# Patient Record
Sex: FEMALE | Race: WHITE | NOT HISPANIC OR LATINO | Employment: UNEMPLOYED | URBAN - METROPOLITAN AREA
[De-identification: names, ages, dates, MRNs, and addresses within clinical notes are randomized per-mention and may not be internally consistent; named-entity substitution may affect disease eponyms.]

---

## 2021-04-08 DIAGNOSIS — Z23 ENCOUNTER FOR IMMUNIZATION: ICD-10-CM

## 2022-10-24 NOTE — PROGRESS NOTES
PT Evaluation     Today's date: 10/25/2022  Patient name: Jael Youngblood  : 1990  MRN: [id number]  Referring provider: Rick Cabrera DO  Dx:   Encounter Diagnosis     ICD-10-CM    1  Other injury of other muscle(s) and tendon(s) at lower leg level, right leg, initial encounter  S86 891A    2  Bilateral calf pain  M79 661     M79 662    3  Other injury of other muscle(s) and tendon(s) at lower leg level, left leg, initial encounter  S86 [id number]             Assessment  Assessment details: Jael Youngblood is a 27 y o  female who presents with pain, decreased strength, decreased ROM, decreased joint mobility, ambulatory dysfunction and balance dysfunction  Due to these impairments, patient has difficulty performing ADL's, recreational activities, work-related activities, engaging in social activities, ambulation, stair negotiation, lifting/carrying, transfers  Patient's clinical presentation is consistent with their referring diagnosis of b/l calf pain, and injuries of tendons of b/l LE's Patient has been educated in weight bearing status, home exercise program and plan of care  Patient would benefit from skilled physical therapy services to address their aforementioned functional limitations and progress towards prior level of function and independence with home exercise program      Impairments: abnormal gait, abnormal or restricted ROM, activity intolerance, impaired balance, impaired physical strength, lacks appropriate home exercise program, pain with function, weight-bearing intolerance and poor body mechanics  Functional limitations: walk, stand, STS, stair negotiation, squat, lunge   Prognosis: good  Prognosis details: + factors: age, high motivation levels  - factors: none    Goals  Short Term Goals to be accomplished by 22:  STG1: Pt will be I with HEP  STG2: Pt will demo 20 degrees inc in ankle dorsiflexion AROM to improve gait     STG3: Pt will be able to jog for 5 min at slow pace with proper running mechanics pain free  Long Term Goals to be accomplished by 23     LTG1: Pt will be able to return to running for 5 miles with proper running mechanics pain free as per PLOF  LTG2: Pt will be able to walk for 3 miles pain free as per PLOF  Plan  Plan details: HEP development, stretching, strengthening, A/AA/PROM, joint mobilizations, posture education, STM/MI as needed to reduce muscle tension, muscle reeducation, PLOC discussed and agreed upon with patient  Patient would benefit from: PT eval and skilled physical therapy  Planned modality interventions: cryotherapy, thermotherapy: hydrocollator packs and unattended electrical stimulation  Planned therapy interventions: manual therapy, neuromuscular re-education, self care, therapeutic activities, therapeutic exercise, home exercise program, balance, joint mobilization, patient education, strengthening, stretching and flexibility  Frequency: 2x week  Duration in weeks: 12  Plan of Care beginning date: 10/25/2022  Plan of Care expiration date: 2023  Treatment plan discussed with: patient    Subjective Evaluation    History of Present Illness  Mechanism of injury: Pt is a 28 y/o female who presents to PT with R lower leg pain that began about 1 months ago in R shin  She is a half marathon runner  She was training for 1/2 marathon  No pain in left side, however she was told to treat L one as well  Went to Dr Oleg Howell w/ [address] [facility] ortho  1-2 weeks after pain started  MRI ordered for anterior tibs and was performed 1 week ago on both shins, however revealed no fracture  She was prescribed PT  History of stress fracture in     Pain  Current pain ratin  At best pain ratin  At worst pain ratin  Location: R shin pain  Quality: throbbing, tight, discomfort and pulling  Relieving factors: relaxation and rest  Aggravating factors: standing, walking, lifting, sitting and stair climbing    Treatments  Current treatment: physical therapy  Patient Goals  Patient goals for therapy: increased motion, improved balance, decreased pain, increased strength, independence with ADLs/IADLs, return to sport/leisure activities and return to work  Patient goal: running, jogging, sprinting, walking >15 min, treadmill running    Objective     Tenderness     Additional Tenderness Details  TTP over tibilais anterior b/l muscle belly    Active Range of Motion   Left Ankle/Foot   Dorsiflexion (ke): WFL    Right Ankle/Foot   Dorsiflexion (ke): 15 degrees with pain  Plantar flexion: WFL and with pain  Inversion: WFL  Eversion: WFL    Joint Play   Left Ankle/Foot  Hypomobile in the talocrural joint  Right Ankle/Foot  Hypomobile in the talocrural joint       Strength/Myotome Testing     Right Ankle/Foot   Dorsiflexion: 4-    Additional Strength Details  U/l heel raise:  R: 20 reps  L: 20 reps     S/l hip abd:  R: 3-/5  L: 3-/5    Tests     Additional Tests Details  SLS  R: 30 sec  L: 30 sec    Plank:  Front: 30 sec  R side: 30 sec "more wobbly"  L side: 30 sec       Precautions:   none      Manuals 10/24        STM tibialis anterior         PROM stretch                           Neuro Re-Ed 10/24        Plank :30        Side plank :30x ea                                                     Ther Ex 10/24        S/l hip abd 2x10         Fig 4 bridge 2x10                                                               Ther Activity 10/24        U/l LP          U/l LP in Plantar flexion         U/l calf press

## 2022-10-25 ENCOUNTER — EVALUATION (OUTPATIENT)
Dept: PHYSICAL THERAPY | Facility: CLINIC | Age: 32
End: 2022-10-25
Payer: COMMERCIAL

## 2022-10-25 DIAGNOSIS — M79.661 BILATERAL CALF PAIN: ICD-10-CM

## 2022-10-25 DIAGNOSIS — M79.662 BILATERAL CALF PAIN: ICD-10-CM

## 2022-10-25 DIAGNOSIS — S86.892A OTHER INJURY OF OTHER MUSCLE(S) AND TENDON(S) AT LOWER LEG LEVEL, LEFT LEG, INITIAL ENCOUNTER: ICD-10-CM

## 2022-10-25 DIAGNOSIS — S86.891A OTHER INJURY OF OTHER MUSCLE(S) AND TENDON(S) AT LOWER LEG LEVEL, RIGHT LEG, INITIAL ENCOUNTER: Primary | ICD-10-CM

## 2022-10-25 PROCEDURE — 97162 PT EVAL MOD COMPLEX 30 MIN: CPT | Performed by: PHYSICAL THERAPIST

## 2022-10-25 NOTE — LETTER
2022    Castillo Feldman, [address] [facility]  [address]  [address] [address]    Patient: Jose F Pettit   YOB: 1990   Date of Visit: 10/25/2022     Encounter Diagnosis     ICD-10-CM    1  Other injury of other muscle(s) and tendon(s) at lower leg level, right leg, initial encounter  S86 891A    2  Bilateral calf pain  M79 661     M79 662    3  Other injury of other muscle(s) and tendon(s) at lower leg level, left leg, initial encounter  [id number]        Dear Dr Dye Shelter:    Thank you for your recent referral of Jose F Pettit  Please review the attached evaluation summary from Yenny's recent visit  Please verify that you agree with the plan of care by signing the attached order  If you have any questions or concerns, please do not hesitate to call  I sincerely appreciate the opportunity to share in the care of one of your patients and hope to have another opportunity to work with you in the near future  Sincerely,    Shyanne Chan, PT      Referring Provider:      I certify that I have read the below Plan of Care and certify the need for these services furnished under this plan of treatment while under my care  Castillo Feldman, [facility]  [address]  [address] [address]  Via Fax: [phone]          PT Evaluation     Today's date: 10/25/2022  Patient name: Jose F Pettit  : 1990  MRN: [id number]  Referring provider: Concha Johnson DO  Dx:   Encounter Diagnosis     ICD-10-CM    1  Other injury of other muscle(s) and tendon(s) at lower leg level, right leg, initial encounter  S86 891A    2  Bilateral calf pain  M79 661     M79 662    3   Other injury of other muscle(s) and tendon(s) at lower leg level, left leg, initial encounter  S86 892A             Assessment  Assessment details: Jose F Pettit is a 27 y o  female who presents with pain, decreased strength, decreased ROM, decreased joint mobility, ambulatory dysfunction and balance dysfunction  Due to these impairments, patient has difficulty performing ADL's, recreational activities, work-related activities, engaging in social activities, ambulation, stair negotiation, lifting/carrying, transfers  Patient's clinical presentation is consistent with their referring diagnosis of b/l calf pain, and injuries of tendons of b/l LE's Patient has been educated in weight bearing status, home exercise program and plan of care  Patient would benefit from skilled physical therapy services to address their aforementioned functional limitations and progress towards prior level of function and independence with home exercise program      Impairments: abnormal gait, abnormal or restricted ROM, activity intolerance, impaired balance, impaired physical strength, lacks appropriate home exercise program, pain with function, weight-bearing intolerance and poor body mechanics  Functional limitations: walk, stand, STS, stair negotiation, squat, lunge   Prognosis: good  Prognosis details: + factors: age, high motivation levels  - factors: none    Goals  Short Term Goals to be accomplished by 11/22/22:  STG1: Pt will be I with HEP  STG2: Pt will demo 20 degrees inc in ankle dorsiflexion AROM to improve gait  STG3: Pt will be able to jog for 5 min at slow pace with proper running mechanics pain free  Long Term Goals to be accomplished by 1/17/23     LTG1: Pt will be able to return to running for 5 miles with proper running mechanics pain free as per PLOF  LTG2: Pt will be able to walk for 3 miles pain free as per PLOF  Plan  Plan details: HEP development, stretching, strengthening, A/AA/PROM, joint mobilizations, posture education, STM/MI as needed to reduce muscle tension, muscle reeducation, PLOC discussed and agreed upon with patient        Patient would benefit from: PT eval and skilled physical therapy  Planned modality interventions: cryotherapy, thermotherapy: hydrocollator packs and unattended electrical stimulation  Planned therapy interventions: manual therapy, neuromuscular re-education, self care, therapeutic activities, therapeutic exercise, home exercise program, balance, joint mobilization, patient education, strengthening, stretching and flexibility  Frequency: 2x week  Duration in weeks: 12  Plan of Care beginning date: 10/25/2022  Plan of Care expiration date: 2023  Treatment plan discussed with: patient    Subjective Evaluation    History of Present Illness  Mechanism of injury: Pt is a 26 y/o female who presents to PT with R lower leg pain that began about 1 months ago in R shin  She is a half marathon runner  She was training for  marathon  No pain in left side, however she was told to treat L one as well  Went to Dr Jason Ye w/ [address] [place] [address] ortho  1-2 weeks after pain started  MRI ordered for anterior tibs and was performed 1 week ago on both shins, however revealed no fracture  She was prescribed PT  History of stress fracture in     Pain  Current pain ratin  At best pain ratin  At worst pain ratin  Location: R shin pain  Quality: throbbing, tight, discomfort and pulling  Relieving factors: relaxation and rest  Aggravating factors: standing, walking, lifting, sitting and stair climbing    Treatments  Current treatment: physical therapy  Patient Goals  Patient goals for therapy: increased motion, improved balance, decreased pain, increased strength, independence with ADLs/IADLs, return to sport/leisure activities and return to work  Patient goal: running, jogging, sprinting, walking >15 min, treadmill running    Objective     Tenderness     Additional Tenderness Details  TTP over tibilais anterior b/l muscle belly    Active Range of Motion   Left Ankle/Foot   Dorsiflexion (ke): WFL    Right Ankle/Foot   Dorsiflexion (ke): 15 degrees with pain  Plantar flexion: WFL and with pain  Inversion: WFL  Eversion: WFL    Joint Play   Left Ankle/Foot  Hypomobile in the talocrural joint  Right Ankle/Foot  Hypomobile in the talocrural joint       Strength/Myotome Testing     Right Ankle/Foot   Dorsiflexion: 4-    Additional Strength Details  U/l heel raise:  R: 20 reps  L: 20 reps     S/l hip abd:  R: 3-/5  L: 3-/5    Tests     Additional Tests Details  SLS  R: 30 sec  L: 30 sec    Plank:  Front: 30 sec  R side: 30 sec "more wobbly"  L side: 30 sec       Precautions:   none      Manuals 10/24        STM tibialis anterior         PROM stretch                           Neuro Re-Ed 10/24        Plank :30        Side plank :30x ea                                                     Ther Ex 10/24        S/l hip abd 2x10         Fig 4 bridge 2x10                                                               Ther Activity 10/24        U/l LP          U/l LP in Plantar flexion         U/l calf press

## 2022-10-25 NOTE — LETTER
2022    Royal Johnson, [address] [place] [address]  [address]  [address] [address]    Patient: Loi Perez   YOB: 1990   Date of Visit: 10/25/2022     Encounter Diagnosis     ICD-10-CM    1  Other injury of other muscle(s) and tendon(s) at lower leg level, right leg, initial encounter  S86 891A    2  Bilateral calf pain  M79 661     M79 662    3  Other injury of other muscle(s) and tendon(s) at lower leg level, left leg, initial encounter  [id number]        Dear Dr Prudence Mckeon:    Thank you for your recent referral of oLi Perez  Please review the attached evaluation summary from Yenny's recent visit  Please verify that you agree with the plan of care by signing the attached order  If you have any questions or concerns, please do not hesitate to call  I sincerely appreciate the opportunity to share in the care of one of your patients and hope to have another opportunity to work with you in the near future  Sincerely,    Bibi Miranda, PT      Referring Provider:      I certify that I have read the below Plan of Care and certify the need for these services furnished under this plan of treatment while under my care  Royal Johnson, [facility]  [address]  [address] [address]  Via Fax: [phone]          PT Evaluation     Today's date: 10/25/2022  Patient name: Loi Perez  : 1990  MRN: [id number]  Referring provider: Yasmin Aldrich DO  Dx:   Encounter Diagnosis     ICD-10-CM    1  Other injury of other muscle(s) and tendon(s) at lower leg level, right leg, initial encounter  S86 891A    2  Bilateral calf pain  M79 661     M79 662    3   Other injury of other muscle(s) and tendon(s) at lower leg level, left leg, initial encounter  S86 892A             Assessment  Assessment details: Loi Perez is a 27 y o  female who presents with pain, decreased strength, decreased ROM, decreased joint mobility, ambulatory dysfunction and balance dysfunction  Due to these impairments, patient has difficulty performing ADL's, recreational activities, work-related activities, engaging in social activities, ambulation, stair negotiation, lifting/carrying, transfers  Patient's clinical presentation is consistent with their referring diagnosis of b/l calf pain, and injuries of tendons of b/l LE's Patient has been educated in weight bearing status, home exercise program and plan of care  Patient would benefit from skilled physical therapy services to address their aforementioned functional limitations and progress towards prior level of function and independence with home exercise program      Impairments: abnormal gait, abnormal or restricted ROM, activity intolerance, impaired balance, impaired physical strength, lacks appropriate home exercise program, pain with function, weight-bearing intolerance and poor body mechanics  Functional limitations: walk, stand, STS, stair negotiation, squat, lunge   Prognosis: good  Prognosis details: + factors: age, high motivation levels  - factors:     Goals  Short Term Goals to be accomplished by     11/22/22:  STG1: Pt will be I with HEP  STG2: Pt will demo 20 degrees inc in ankle dorsiflexion AROM to improve gait  STG3: Pt will demo     u/l heel raises to demonstrate improved strength  STG4: Pt will amb community distance without gait deviates due to pain  Long Term Goals to be accomplished by 1/17/23     LTG1: Pt will demo ankle strength WNL to return to PLOF  LTG2: Pt will demo ankle AROM WNL to minimize gait deviations  LTG3: Pt will return to household ADLs and work duties pain free as per [address] details: HEP development, stretching, strengthening, A/AA/PROM, joint mobilizations, posture education, STM/MI as needed to reduce muscle tension, muscle reeducation, PLOC discussed and agreed upon with patient        Patient would benefit from: PT eval and skilled physical therapy  Planned modality interventions: cryotherapy, thermotherapy: hydrocollator packs and unattended electrical stimulation  Planned therapy interventions: manual therapy, neuromuscular re-education, self care, therapeutic activities, therapeutic exercise, home exercise program, balance, joint mobilization, patient education, strengthening, stretching and flexibility  Frequency: 2x week  Duration in weeks: 12  Plan of Care beginning date: 10/25/2022  Plan of Care expiration date: 2023  Treatment plan discussed with: patient    Subjective Evaluation    History of Present Illness  Mechanism of injury: Pt is a 28 y/o female who presents to PT with R lower leg pain that began about 1 months ago in R shin  She is a half marathon runner  She was training for  marathon  No pain in left side, however she was told to treat L one as well  Went to Dr Christina Hutton w/ [address] [place] [address] ortho  1-2 weeks after pain started  MRI ordered for anterior tibs     MRI was performed 1 week ago on both shins  History of stress fracture in   Pain  Current pain ratin  At best pain ratin  At worst pain ratin  Location: R shin pain  Quality: throbbing, tight, discomfort and pulling  Relieving factors: relaxation and rest  Aggravating factors: standing, walking, lifting, sitting and stair climbing    Treatments  Current treatment: physical therapy  Patient Goals  Patient goals for therapy: increased motion, improved balance, decreased pain, increased strength, independence with ADLs/IADLs, return to sport/leisure activities and return to work  Patient goal: running, jogging, sprinting, walking >15 min, treadmill running    Objective     Observations     Additional Observation Details        Tenderness     Additional Tenderness Details  TTP over tibilais anterior b/l muscle belly    Active Range of Motion   Left Ankle/Foot   Dorsiflexion (ke): WFL    Right Ankle/Foot   Dorsiflexion (ke): 15 degrees with pain  Plantar flexion: WFL and with pain  Inversion: WFL  Eversion: [facility]    Joint Play   Left Ankle/Foot  Hypomobile in the talocrural joint  Right Ankle/Foot  Hypomobile in the talocrural joint  Strength/Myotome Testing     Right Ankle/Foot   Dorsiflexion: 4-    Additional Strength Details  U/l heel raise:  R: 20 reps  L: 20 reps     S/l hip abd:  R: 3-/5  L: 3-/5    Tests     Additional Tests Details  SLS  R: 30 sec  L: 30 sec    Plank:  Front: 30 sec  R side: 30 sec "more wobbly"  L side: 30 sec       Precautions:        Isadore Cea       Manuals 10/24        STM         PROM stretch                           Neuro Re-Ed 10/24                                                                       Ther Ex 10/24        Calf stretch step         Soleus stretch incline          Heel raise b/l          U/l heel raise                                             Ther Activity 10/24        U/l LP          U/l LP in Plantar flexion         U/l calf press

## 2024-10-30 ENCOUNTER — HOSPITAL ENCOUNTER (EMERGENCY)
Dept: HOSPITAL 99 - EMR | Age: 34
Discharge: HOME | End: 2024-10-30
Payer: COMMERCIAL

## 2024-10-30 VITALS — RESPIRATION RATE: 16 BRPM | SYSTOLIC BLOOD PRESSURE: 136 MMHG | DIASTOLIC BLOOD PRESSURE: 81 MMHG

## 2024-10-30 VITALS — RESPIRATION RATE: 16 BRPM | SYSTOLIC BLOOD PRESSURE: 149 MMHG | DIASTOLIC BLOOD PRESSURE: 98 MMHG
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
## 2024-10-30 DIAGNOSIS — S06.0XAA: Primary | ICD-10-CM

## 2024-10-30 DIAGNOSIS — Z87.891: ICD-10-CM

## 2024-10-30 DIAGNOSIS — Z90.49: ICD-10-CM

## 2024-10-30 DIAGNOSIS — S01.111A: ICD-10-CM

## 2024-10-30 DIAGNOSIS — Z23: ICD-10-CM

## 2024-10-30 PROCEDURE — 90471 IMMUNIZATION ADMIN: CPT

## 2024-10-30 PROCEDURE — 99282 EMERGENCY DEPT VISIT SF MDM: CPT

## 2024-10-30 RX ADMIN — CLOSTRIDIUM TETANI TOXOID ANTIGEN (FORMALDEHYDE INACTIVATED), CORYNEBACTERIUM DIPHTHERIAE TOXOID ANTIGEN (FORMALDEHYDE INACTIVATED), BORDETELLA PERTUSSIS TOXOID ANTIGEN (GLUTARALDEHYDE INACTIVATED), BORDETELLA PERTUSSIS FILAMENTOUS HEMAGGLUTININ ANTIGEN (FORMALDEHYDE INACTIVATED), BORDETELLA PERTUSSIS PERTACTIN ANTIGEN, AND BORDETELLA PERTUSSIS FIMBRIAE 2/3 ANTIGEN 0.5 ML: 5; 2; 2.5; 5; 3; 5 INJECTION, SUSPENSION INTRAMUSCULAR at 19:30

## 2025-02-02 ENCOUNTER — HOSPITAL ENCOUNTER (EMERGENCY)
Dept: HOSPITAL 99 - EMR | Age: 35
Discharge: HOME | End: 2025-02-02
Payer: COMMERCIAL

## 2025-02-02 VITALS — SYSTOLIC BLOOD PRESSURE: 119 MMHG | DIASTOLIC BLOOD PRESSURE: 70 MMHG

## 2025-02-02 VITALS — DIASTOLIC BLOOD PRESSURE: 67 MMHG | SYSTOLIC BLOOD PRESSURE: 115 MMHG

## 2025-02-02 VITALS — SYSTOLIC BLOOD PRESSURE: 95 MMHG | RESPIRATION RATE: 20 BRPM | DIASTOLIC BLOOD PRESSURE: 75 MMHG

## 2025-02-02 DIAGNOSIS — Z87.891: ICD-10-CM

## 2025-02-02 DIAGNOSIS — K52.9: ICD-10-CM

## 2025-02-02 DIAGNOSIS — Z90.49: ICD-10-CM

## 2025-02-02 DIAGNOSIS — E86.0: Primary | ICD-10-CM

## 2025-02-02 DIAGNOSIS — F50.20: ICD-10-CM

## 2025-02-02 DIAGNOSIS — F90.9: ICD-10-CM

## 2025-02-02 LAB
ALBUMIN SERPL-MCNC: 5.2 G/DL (ref 3.5–5)
ALP SERPL-CCNC: 34 U/L (ref 38–126)
ALT SERPL-CCNC: 31 U/L (ref 0–35)
AST SERPL-CCNC: 30 U/L (ref 14–36)
BUN SERPL-MCNC: 24 MG/DL (ref 7–17)
CALCIUM SERPL-MCNC: 9.7 MG/DL (ref 8.4–10.2)
CHLORIDE SERPL-SCNC: 104 MMOL/L (ref 98–107)
CO2 SERPL-SCNC: 22 MMOL/L (ref 22–30)
EGFR: > 60
ERYTHROCYTE [DISTWIDTH] IN BLOOD BY AUTOMATED COUNT: 13.2 % (ref 11.5–14.5)
GLUCOSE SERPL-MCNC: 170 MG/DL (ref 70–99)
HCT VFR BLD AUTO: 47.5 % (ref 37–47)
HGB BLD-MCNC: 16.4 G/DL (ref 12–16)
LIPASE SERPL-CCNC: 46 U/L (ref 23–300)
MCHC RBC AUTO-ENTMCNC: 34.5 G/DL (ref 33–37)
MCV RBC AUTO: 86.4 FL (ref 81–99)
NRBC BLD AUTO-RTO: 0 %
PLATELET # BLD AUTO: 293 10^3/UL (ref 130–400)
POTASSIUM SERPL-SCNC: 4.5 MMOL/L (ref 3.5–5.1)
PROT SERPL-MCNC: 7.7 G/DL (ref 6.3–8.2)
SODIUM SERPL-SCNC: 139 MMOL/L (ref 135–145)

## 2025-02-02 PROCEDURE — 96374 THER/PROPH/DIAG INJ IV PUSH: CPT

## 2025-02-02 PROCEDURE — 96375 TX/PRO/DX INJ NEW DRUG ADDON: CPT

## 2025-02-02 PROCEDURE — 99283 EMERGENCY DEPT VISIT LOW MDM: CPT

## 2025-02-02 PROCEDURE — 96361 HYDRATE IV INFUSION ADD-ON: CPT

## 2025-02-02 RX ADMIN — ONDANSETRON HYDROCHLORIDE 4 MG: 2 SOLUTION INTRAMUSCULAR; INTRAVENOUS at 19:03

## 2025-02-02 RX ADMIN — SODIUM CHLORIDE 1000: 900 INJECTION, SOLUTION INTRAVENOUS at 19:02

## 2025-02-02 RX ADMIN — KETOROLAC TROMETHAMINE 15 MG: 15 INJECTION, SOLUTION INTRAMUSCULAR; INTRAVENOUS at 19:03
